# Patient Record
Sex: MALE | Race: WHITE | NOT HISPANIC OR LATINO | Employment: FULL TIME | URBAN - METROPOLITAN AREA
[De-identification: names, ages, dates, MRNs, and addresses within clinical notes are randomized per-mention and may not be internally consistent; named-entity substitution may affect disease eponyms.]

---

## 2024-07-31 ENCOUNTER — OFFICE VISIT (OUTPATIENT)
Dept: ORTHOPEDIC SURGERY | Facility: CLINIC | Age: 24
End: 2024-07-31
Payer: COMMERCIAL

## 2024-07-31 VITALS — OXYGEN SATURATION: 98 % | BODY MASS INDEX: 20.7 KG/M2 | WEIGHT: 170 LBS | HEART RATE: 64 BPM | HEIGHT: 76 IN

## 2024-07-31 DIAGNOSIS — M25.561 ACUTE PAIN OF RIGHT KNEE: ICD-10-CM

## 2024-07-31 DIAGNOSIS — S83.412A SPRAIN OF MEDIAL COLLATERAL LIGAMENT OF LEFT KNEE, INITIAL ENCOUNTER: ICD-10-CM

## 2024-07-31 DIAGNOSIS — M25.361 INSTABILITY OF RIGHT KNEE JOINT: Primary | ICD-10-CM

## 2024-07-31 DIAGNOSIS — M25.561 MEDIAL JOINT LINE TENDERNESS OF KNEE, RIGHT: ICD-10-CM

## 2024-07-31 NOTE — PROGRESS NOTES
"NEW VISIT    Patient: Tera Morrison  ?  YOB: 2000    MRN: [id number]  ?  Chief Complaint   Patient presents with    Right Knee - Pain, Initial Evaluation     DOI: 2 weeks ago       ?  HPI: Patient is a 23-year-old male presenting today for acute right knee injury.  He states he was playing in a basketball game 2 weeks ago when he went up for a block, when he subsequently landed right leg slid into valgus mechanism, causing instantaneous medial knee pain.  Was able to continue basketball activity that day, with substantial increase in pain that night with associated mild swelling.  Since that time has been unable to return to basketball or tennis activity, as he has reproducible posterior medial knee pain with any cutting or pivoting maneuvers.  In addition to feeling of instability with sporting activity.  He denies any locking, catching or popping.  Denies numbness or tingling.  Denies any prior knee injuries or surgeries.  Current treatment includes activity modification as needed over-the-counter medications.       Allergies: No Known Allergies    History reviewed. No pertinent past medical history.  History reviewed. No pertinent surgical history.  Social History     Occupational History    Not on file   Tobacco Use    Smoking status: Never    Smokeless tobacco: Never   Vaping Use    Vaping status: Never Used   Substance and Sexual Activity    Alcohol use: Not Currently    Drug use: Never    Sexual activity: Defer      Social History     Social History Narrative    Not on file     History reviewed. No pertinent family history.    Review of Systems  Constitutional: Negative.  Negative for fever.   Musculoskeletal: Positive for joint pain  Skin: Negative.  Negative for rash and wound.    Neurological: Negative for numbness.     Vitals:    07/31/24 1338   Pulse: 64   SpO2: 98%   Weight: 77.1 kg (170 lb)   Height: 193 cm (76\")        Physical Exam  Constitutional: Patient is oriented to person, " place, and time. Appears well-developed and well-nourished.   Head: Normocephalic and atraumatic.   Pulmonary/Chest: Effort normal.   Musculoskeletal:   See detailed exam below   Neurological: Alert and oriented to person, place, and time. No sensory deficit. Coordination normal.   Skin: Skin is warm and dry. Capillary refill takes less than 2 seconds. No rash noted. No erythema.     The right knee is without obvious signs of acute bony deformity, quadriceps atrophy, erythema or ecchymosis.  Mild joint effusion.  Mild fullness in popliteal space consistent with mild Baker's cyst.  The patella is without tenderness. Apprehension is negative with medial and lateral glide. Patella crepitus is negative. Patella grind is negative.  There is focal tenderness over posterior medial joint space.  Lateral joint space nontender.  Soft tissue including the distal hamstring tendons, pes anserine, quad tendon, patellar tendon, proximal gastroc tendon, distal IT band, and Gerdy's tubercle are nontender. Flexion & extension are full and symmetrical with discomfort over posterior medial knee with endrange flexion.  Knee and hip strength is 5/5 and symmetrical.  Valgus stress reproduce MCL pain with firm endpoint.  Varus stress negative.  Uvaldo and Thessaly positive for posterior medial joint line pain.  Lachman's, anterior drawer, and posterior drawer are all negative. The opposite knee is nontender and stable. Gait is uncomfortable and tandem.    Diagnostics:  xrays obtained today     Right Knee X-Ray  Indication: Pain    Views: AP, Lateral, and [place]    Findings:  No fracture  No bony lesion  Mild joint effusion  Normal joint spaces    Assessment:  Diagnoses and all orders for this visit:    1. Instability of right knee joint (Primary)  -     MRI Knee Right Without Contrast; Future    2. Medial joint line tenderness of knee, right  -     MRI Knee Right Without Contrast; Future    3. Sprain of medial collateral ligament of  left knee, initial encounter    4. Acute pain of right knee  -     XR Knee 3 View Right  -     MRI Knee Right Without Contrast; Future        Plan    Above diagnosis and plan discussed the patient in office today.  I did obtain and personally review right knee x-rays negative for acute osseous abnormality.  Clinical history and exam concerning for posteromedial meniscal pathology.  The patient has tenderness over posterior medial joint space, reproducible with meniscal testing including Uvaldo and Thessaly in office today, as well as knee instability with cutting or rotational sporting activities.  As such we will obtain MRI to further evaluate medial meniscus.  Additionally pain with MCL testing although with firm endpoint likely indicating low-grade MCL sprain.  Will plan follow-up after MRI is obtained review results and further treatment plan.  Advised to avoid any basketball/tennis activities, or any other cutting, pivoting or uneven surface activities until MRI follow-up.  Additionally encouraged regular RICE treatment and oral anti-inflammatory as needed for pain and swelling.  Will plan follow-up after MRI is obtained review results and further treatment plan  Rest, ice, compression, and elevation (RICE) therapy  Alternate OTC Ibuprofen and Tylenol as needed/if clinically indicated  Follow up after MRI obtained.        Date of encounter: 07/31/2024   Hunter Angulo DO    Electronically signed by Hunter Angulo DO, 07/31/24, 1:54 PM EDT.    Disclaimer: Please note that areas of this note were completed with computer voice recognition software.  Quite often unanticipated grammatical, syntax, homophones, and other interpretive errors are inadvertently transcribed by the computer software. Please excuse any errors that have escaped final proofreading.

## 2024-08-02 ENCOUNTER — PATIENT ROUNDING (BHMG ONLY) (OUTPATIENT)
Dept: ORTHOPEDIC SURGERY | Facility: CLINIC | Age: 24
End: 2024-08-02
Payer: COMMERCIAL

## 2024-08-21 ENCOUNTER — HOSPITAL ENCOUNTER (OUTPATIENT)
Dept: MRI IMAGING | Facility: HOSPITAL | Age: 24
Discharge: HOME | End: 2024-08-21
Admitting: STUDENT IN AN ORGANIZED HEALTH CARE EDUCATION/TRAINING PROGRAM
Payer: COMMERCIAL

## 2024-08-21 DIAGNOSIS — M25.561 ACUTE PAIN OF RIGHT KNEE: ICD-10-CM

## 2024-08-21 DIAGNOSIS — M25.561 MEDIAL JOINT LINE TENDERNESS OF KNEE, RIGHT: ICD-10-CM

## 2024-08-21 DIAGNOSIS — M25.361 INSTABILITY OF RIGHT KNEE JOINT: ICD-10-CM

## 2024-08-21 PROCEDURE — 73721 MRI JNT OF LWR EXTRE W/O DYE: CPT
